# Patient Record
(demographics unavailable — no encounter records)

---

## 2024-10-23 NOTE — REASON FOR VISIT
[FreeTextEntry2] : c/o rough spots on scalp for months. No treatment. No personal history of cutaneous malignancy. With wife.

## 2024-10-29 NOTE — PHYSICAL EXAM
[No Acute Distress] : no acute distress [Well Nourished] : well nourished [Well Developed] : well developed [Well-Appearing] : well-appearing [Normal Sclera/Conjunctiva] : normal sclera/conjunctiva [PERRL] : pupils equal round and reactive to light [EOMI] : extraocular movements intact [Normal Outer Ear/Nose] : the outer ears and nose were normal in appearance [Normal Oropharynx] : the oropharynx was normal [No JVD] : no jugular venous distention [No Lymphadenopathy] : no lymphadenopathy [Supple] : supple [Thyroid Normal, No Nodules] : the thyroid was normal and there were no nodules present [No Respiratory Distress] : no respiratory distress  [No Accessory Muscle Use] : no accessory muscle use [Clear to Auscultation] : lungs were clear to auscultation bilaterally [Normal Rate] : normal rate  [Regular Rhythm] : with a regular rhythm [Normal S1, S2] : normal S1 and S2 [No Murmur] : no murmur heard [No Carotid Bruits] : no carotid bruits [No Abdominal Bruit] : a ~M bruit was not heard ~T in the abdomen [No Varicosities] : no varicosities [Pedal Pulses Present] : the pedal pulses are present [No Edema] : there was no peripheral edema [No Palpable Aorta] : no palpable aorta [No Extremity Clubbing/Cyanosis] : no extremity clubbing/cyanosis [Soft] : abdomen soft [Non Tender] : non-tender [Non-distended] : non-distended [No Masses] : no abdominal mass palpated [No HSM] : no HSM [Normal Bowel Sounds] : normal bowel sounds [Normal Posterior Cervical Nodes] : no posterior cervical lymphadenopathy [Normal Anterior Cervical Nodes] : no anterior cervical lymphadenopathy [No CVA Tenderness] : no CVA  tenderness [No Spinal Tenderness] : no spinal tenderness [No Joint Swelling] : no joint swelling [Grossly Normal Strength/Tone] : grossly normal strength/tone [No Rash] : no rash [Coordination Grossly Intact] : coordination grossly intact [No Focal Deficits] : no focal deficits [Normal Gait] : normal gait [Deep Tendon Reflexes (DTR)] : deep tendon reflexes were 2+ and symmetric [Normal Affect] : the affect was normal [Normal Insight/Judgement] : insight and judgment were intact [de-identified] : Obese

## 2024-10-29 NOTE — HISTORY OF PRESENT ILLNESS
[FreeTextEntry1] : CPE [de-identified] : Most pleasant 69-year-old white male with a history of calcific aortic stenosis status post TAVR July 2023 for progressive BURNETTE, hypertension, hyperlipidemia, medically managed coronary artery disease, BPH, diverticulosis, insomnia, who comes in for CPE, last seen in our clinic 8/2024 for acute illness. He sees multiple providers including cardiology urology.  He doesn't want flu shot, stating a friend told him it causes strokes. SADIE paper last year using Medicare data base analysis observed 25% RR increase [id number] days post vaccine, not seen earlier or later. co admn covid and flu associated with an additional 3cases/100k doses ONLY in pts over age 85. In fact, 5 other studies including 2022 Neurology article retrospective analysis of large data set shows comparable raw stroke rates in pts receiving flu shot vs not, within prior 2 wks (and adjustment for RFs actually favored flu shot, 12% risk reduction).  Metaanalysis 2017 18% risk reduction of stroke with flu vaccine.   Pt remains adamant, unfortunately.  He has no acute concerns.

## 2024-10-29 NOTE — ASSESSMENT
[FreeTextEntry1] : *Calcific aortic stenosis status post TAVR July 2023. *Nonocclusive coronary artery disease. Sees cardiology regularly, risk factors being well managed on statin amlodipine lisinopril hydrochlorothiazide baby aspirin, 12.5 mg metoprolol.    *Hypertension. 4 drugs. Normal creatinine and potassium 3/24. Update proteinuria screen next year, neg 2023.  *Hyperlipidemia and rosuvastatin 40 mg. Good ctrl, LDL 71, 2024.89 Lpa, in part inflated by statin.  *BPH. Followed by Dr. Page every 6 months, PSA through urology 5.7-7.0 over the last 5 years. Never biopsy or MRI.  *Insomnia. Rumination. I cannot elicit a history of restless leg syndrome obstructive sleep apnea cataplexy and narcolepsy. Patient acknowledges he is somewhat OCD and will get up in the middle the night to work on stuff. Has tried Ambien without benefit, melatonin brief relief for 4 hours. EKG without QTc prolongation 392msec 6/12/23. Trial nortriptyline 10 mg nightly without benefit, could not get his insurance to pay for Dayvigo 5 mg nightly.  *Routine adult health maintenance.  Vaccinations: Tdap: More than 10 years, Prevnar 13 2021 Prevnar 20.2023, Completed shingrix. Declines flu, covid boosters. RSV not indicated Colon cancer screening: Last colonoscopy 2017, Dr. Zayas. No history of polyps adequate colon prep no family history colon cancer. Recommend follow-up colonoscopy by 2027.  Screen negative for exposure to HBV HCV HIV, 2023.  It was a pleasure to visit with Mr. Gonzalez today. Answered all questions best I could. Disposition:6months after labs  Time:40min

## 2024-10-29 NOTE — PHYSICAL EXAM
[No Acute Distress] : no acute distress [Well Nourished] : well nourished [Well Developed] : well developed [Well-Appearing] : well-appearing [Normal Sclera/Conjunctiva] : normal sclera/conjunctiva [PERRL] : pupils equal round and reactive to light [EOMI] : extraocular movements intact [Normal Outer Ear/Nose] : the outer ears and nose were normal in appearance [Normal Oropharynx] : the oropharynx was normal [No JVD] : no jugular venous distention [No Lymphadenopathy] : no lymphadenopathy [Supple] : supple [Thyroid Normal, No Nodules] : the thyroid was normal and there were no nodules present [No Respiratory Distress] : no respiratory distress  [No Accessory Muscle Use] : no accessory muscle use [Clear to Auscultation] : lungs were clear to auscultation bilaterally [Normal Rate] : normal rate  [Regular Rhythm] : with a regular rhythm [Normal S1, S2] : normal S1 and S2 [No Murmur] : no murmur heard [No Carotid Bruits] : no carotid bruits [No Abdominal Bruit] : a ~M bruit was not heard ~T in the abdomen [No Varicosities] : no varicosities [Pedal Pulses Present] : the pedal pulses are present [No Edema] : there was no peripheral edema [No Palpable Aorta] : no palpable aorta [No Extremity Clubbing/Cyanosis] : no extremity clubbing/cyanosis [Soft] : abdomen soft [Non Tender] : non-tender [Non-distended] : non-distended [No Masses] : no abdominal mass palpated [No HSM] : no HSM [Normal Bowel Sounds] : normal bowel sounds [Normal Posterior Cervical Nodes] : no posterior cervical lymphadenopathy [Normal Anterior Cervical Nodes] : no anterior cervical lymphadenopathy [No CVA Tenderness] : no CVA  tenderness [No Spinal Tenderness] : no spinal tenderness [No Joint Swelling] : no joint swelling [Grossly Normal Strength/Tone] : grossly normal strength/tone [No Rash] : no rash [Coordination Grossly Intact] : coordination grossly intact [No Focal Deficits] : no focal deficits [Normal Gait] : normal gait [Deep Tendon Reflexes (DTR)] : deep tendon reflexes were 2+ and symmetric [Normal Affect] : the affect was normal [Normal Insight/Judgement] : insight and judgment were intact [de-identified] : Obese

## 2024-10-29 NOTE — HISTORY OF PRESENT ILLNESS
[FreeTextEntry1] : CPE [de-identified] : Most pleasant 69-year-old white male with a history of calcific aortic stenosis status post TAVR July 2023 for progressive BURNETTE, hypertension, hyperlipidemia, medically managed coronary artery disease, BPH, diverticulosis, insomnia, who comes in for CPE, last seen in our clinic 8/2024 for acute illness. He sees multiple providers including cardiology urology.  He doesn't want flu shot, stating a friend told him it causes strokes. SADIE paper last year using Medicare data base analysis observed 25% RR increase [id number] days post vaccine, not seen earlier or later. co admn covid and flu associated with an additional 3cases/100k doses ONLY in pts over age 85. In fact, 5 other studies including 2022 Neurology article retrospective analysis of large data set shows comparable raw stroke rates in pts receiving flu shot vs not, within prior 2 wks (and adjustment for RFs actually favored flu shot, 12% risk reduction).  Metaanalysis 2017 18% risk reduction of stroke with flu vaccine.   Pt remains adamant, unfortunately.  He has no acute concerns.

## 2024-10-29 NOTE — HEALTH RISK ASSESSMENT
[Excellent] : ~his/her~  mood as  excellent [Yes] : Yes [Monthly or less (1 pt)] : Monthly or less (1 point) [1 or 2 (0 pts)] : 1 or 2 (0 points) [Never (0 pts)] : Never (0 points) [No] : In the past 12 months have you used drugs other than those required for medical reasons? No [No falls in past year] : Patient reported no falls in the past year [0] : 2) Feeling down, depressed, or hopeless: Not at all (0) [PHQ-2 Negative - No further assessment needed] : PHQ-2 Negative - No further assessment needed [HIV test declined] : HIV test declined [Hepatitis C test declined] : Hepatitis C test declined [None] : None [With Family] : lives with family [College] : College [] :  [Sexually Active] : sexually active [Feels Safe at Home] : Feels safe at home [Fully functional (bathing, dressing, toileting, transferring, walking, feeding)] : Fully functional (bathing, dressing, toileting, transferring, walking, feeding) [Fully functional (using the telephone, shopping, preparing meals, housekeeping, doing laundry, using] : Fully functional and needs no help or supervision to perform IADLs (using the telephone, shopping, preparing meals, housekeeping, doing laundry, using transportation, managing medications and managing finances) [Reports normal functional visual acuity (ie: able to read med bottle)] : Reports normal functional visual acuity [Smoke Detector] : smoke detector [Carbon Monoxide Detector] : carbon monoxide detector [Safety elements used in home] : safety elements used in home [Seat Belt] :  uses seat belt [Sunscreen] : uses sunscreen [Audit-CScore] : 1 [de-identified] : Maintains home. No formal exercise [de-identified] : Too good. [place] [place] : [place] [[id number]] : 0 [Change in mental status noted] : No change in mental status noted [Language] : denies difficulty with language [Behavior] : denies difficulty with behavior [Learning/Retaining New Information] : denies difficulty learning/retaining new information [Handling Complex Tasks] : denies difficulty handling complex tasks [Reasoning] : denies difficulty with reasoning [Spatial Ability and Orientation] : denies difficulty with spatial ability and orientation [High Risk Behavior] : no high risk behavior [Reports changes in hearing] : Reports no changes in hearing [Reports changes in vision] : Reports no changes in vision [Reports changes in dental health] : Reports no changes in dental health [Travel to Developing Areas] : does not  travel to developing areas [TB Exposure] : is not being exposed to tuberculosis [Caregiver Concerns] : does not have caregiver concerns [ColonoscopyDate] : 01/17 [ColonoscopyComments] : Procaccino [HIVDate] : 8/23 [HepatitisCDate] : 8/23

## 2024-11-07 NOTE — REASON FOR VISIT
[Structural Heart and Valve Disease] : structural heart and valve disease [Hypertension] : hypertension [Spouse] : spouse [FreeTextEntry3] : [place]

## 2024-11-07 NOTE — CARDIOLOGY SUMMARY
[de-identified] : February 13, 2023 sinus rhythm within normal limits August 14, 2023 sinus rhythm within normal limits November 17, 2023 sinus PVC otherwise normal March 7, 2024 sinus rhythm November 7, 2024 sinus rhythm [de-identified] : 2021 [de-identified] : February 2021 JORGE 1.0  December 13, 2021 moderate to severe aortic stenosis normal LV systolic function November 17, 2023 normal LV TAVR improved AI August 14, 2023 normal LV TAVR present with AI October 20, 2022 moderate to severe AS normal LV systolic function peak velocity 4 m/s  June 2024 normal LV mild AI [de-identified] : CT 2019 nonobstructive CAD

## 2024-11-07 NOTE — HISTORY OF PRESENT ILLNESS
[FreeTextEntry1] : Follow-up visit  status post TAVR is known PVCs.    No chest pain dyspnea.  Recent dental work involving a crown for which he took antibiotics

## 2024-11-07 NOTE — ASSESSMENT
[FreeTextEntry1] : 69-year-old man, status post TAVR.  Prosthetic AI.  Coronary atherosclerosis.  BP controlled.  PVCs.  Overweight.

## 2024-11-07 NOTE — PHYSICAL EXAM
[Well Developed] : well developed [Well Nourished] : well nourished [No Acute Distress] : no acute distress [Normal Conjunctiva] : normal conjunctiva [Normal Venous Pressure] : normal venous pressure [No Carotid Bruit] : no carotid bruit [No Rub] : no rub [No Gallop] : no gallop [Murmur] : murmur [Clear Lung Fields] : clear lung fields [Good Air Entry] : good air entry [No Respiratory Distress] : no respiratory distress  [Soft] : abdomen soft [Non Tender] : non-tender [No Masses/organomegaly] : no masses/organomegaly [Normal Bowel Sounds] : normal bowel sounds [Normal Gait] : normal gait [No Edema] : no edema [No Cyanosis] : no cyanosis [No Clubbing] : no clubbing [Venous varicosities] : venous varicosities [No Rash] : no rash [No Skin Lesions] : no skin lesions [Moves all extremities] : moves all extremities [No Focal Deficits] : no focal deficits [Normal Speech] : normal speech [Alert and Oriented] : alert and oriented [Normal memory] : normal memory [de-identified] : 3/6 BOB, diastolic blow [de-identified] : Soft S2

## 2024-11-07 NOTE — REVIEW OF SYSTEMS
[Weight Gain (___ Lbs)] : no recent weight gain [Feeling Fatigued] : feeling fatigued [Weight Loss (___ Lbs)] : [unfilled] ~Ulb weight loss [Palpitations] : palpitations [Heartburn] : heartburn [Nocturia] : nocturia [Negative] : Respiratory

## 2024-11-15 NOTE — HISTORY OF PRESENT ILLNESS
[FreeTextEntry8] : Most pleasant 69-year-old white male with a history of calcific aortic stenosis status post TAVR July 2023 for progressive BURNETTE, hypertension, hyperlipidemia, medically managed coronary artery disease, BPH, diverticulosis, insomnia, who comes in for 5 day h/o sinus congestion, post nasal drip, failing Navage. Declines fever myalgia sore throat.  He has not had his flu shot this year.

## 2024-11-15 NOTE — ASSESSMENT
[FreeTextEntry1] : *Sinusitis. Continue lavage, start flonase and azithromycin. Swab RVP. Time 10 min

## 2024-11-15 NOTE — PHYSICAL EXAM
[Normal TMs] : both tympanic membranes were normal [No Lymphadenopathy] : no lymphadenopathy [Normal] : affect was normal and insight and judgment were intact [de-identified] : tired [de-identified] : severe cobblestoning, no exudates injection or sinus tenderness

## 2024-11-25 NOTE — REVIEW OF SYSTEMS
[As Noted in HPI] : as noted in HPI [Negative] : Heme/Lymph [Chest Pain] : no chest pain [Palpitations] : no palpitations [FreeTextEntry4] : Recent Sinus Infection  [FreeTextEntry5] : Hx of TVAR 2023

## 2024-11-25 NOTE — PHYSICAL EXAM
[Normal Breath Sounds] : Normal breath sounds [Normal Heart Sounds] : normal heart sounds [No Rash or Lesion] : No rash or lesion [Alert] : alert [Oriented to Person] : oriented to person [Oriented to Place] : oriented to place [Oriented to Time] : oriented to time [Calm] : calm [de-identified] : Abdomen soft and NT  [de-identified] : NAD  [de-identified] : NCAT  [FreeTextEntry1] : Miralax prep reviewed with patient  Supplements reviewed to hold prior to colonoscopy.  Patient to call Dr. Diehl's office to get clearance for outpatient colonoscopy given TAVR and any need for preoperative antibiotics. Last Echo done 6/2024 with normal LV.  Day of instructions reviewed and understood by patient

## 2024-11-25 NOTE — HISTORY OF PRESENT ILLNESS
[FreeTextEntry1] : Jay Gonzalez is a 70 y/o M who presents for screening colonoscopy. His last colonoscopy was in 2017 which noted diverticulosis in left colon and he was recommended for follow up in 5 years. He returned to the office in 2022 for repeat colonoscopy and was recommended for hospital colonoscopy given severe AS. He never obtained this colonoscopy and proceeded with work up of cardiac health. He is now s/p TAVR in 2023 and followed closely by Dr. Diehl. He was last seen in office last week and last echo in 6/2024 which showed normal LV funtion.  He recently completed antibiotics for an acute sinusitis. Patient denies weight loss, abdominal pain, nausea/vomiting, diarrhea, constipation and bleeding.

## 2024-11-25 NOTE — HISTORY OF PRESENT ILLNESS
[FreeTextEntry1] : Jay Gonzalez is a 68 y/o M who presents for screening colonoscopy. His last colonoscopy was in 2017 which noted diverticulosis in left colon and he was recommended for follow up in 5 years. He returned to the office in 2022 for repeat colonoscopy and was recommended for hospital colonoscopy given severe AS. He never obtained this colonoscopy and proceeded with work up of cardiac health. He is now s/p TAVR in 2023 and followed closely by Dr. Diehl. He was last seen in office last week and last echo in 6/2024 which showed normal LV funtion.  He recently completed antibiotics for an acute sinusitis. Patient denies weight loss, abdominal pain, nausea/vomiting, diarrhea, constipation and bleeding.

## 2024-11-25 NOTE — PHYSICAL EXAM
[Normal Breath Sounds] : Normal breath sounds [Normal Heart Sounds] : normal heart sounds [No Rash or Lesion] : No rash or lesion [Alert] : alert [Oriented to Person] : oriented to person [Oriented to Place] : oriented to place [Oriented to Time] : oriented to time [Calm] : calm [de-identified] : Abdomen soft and NT  [de-identified] : NAD  [de-identified] : NCAT  [FreeTextEntry1] : Miralax prep reviewed with patient  Supplements reviewed to hold prior to colonoscopy.  Patient to call Dr. Diehl's office to get clearance for outpatient colonoscopy given TAVR and any need for preoperative antibiotics. Last Echo done 6/2024 with normal LV.  Day of instructions reviewed and understood by patient

## 2025-01-13 NOTE — CARDIOLOGY SUMMARY
[de-identified] : February 13, 2023 sinus rhythm within normal limits August 14, 2023 sinus rhythm within normal limits November 17, 2023 sinus PVC otherwise normal March 7, 2024 sinus rhythm November 7, 2024 sinus rhythm January 13, 2024 sinus rhythm [de-identified] : 2021 [de-identified] : February 2021 JORGE 1.0  December 13, 2021 moderate to severe aortic stenosis normal LV systolic function November 17, 2023 normal LV TAVR improved AI August 14, 2023 normal LV TAVR present with AI October 20, 2022 moderate to severe AS normal LV systolic function peak velocity 4 m/s June 2024 normal LV mild AI [de-identified] : CT 2019 nonobstructive CAD

## 2025-01-13 NOTE — REASON FOR VISIT
[Symptom and Test Evaluation] : symptom and test evaluation [Structural Heart and Valve Disease] : structural heart and valve disease [Hypertension] : hypertension [Spouse] : spouse [FreeTextEntry3] : [place]

## 2025-01-13 NOTE — HISTORY OF PRESENT ILLNESS
[FreeTextEntry1] : Follow-up visit  status post TAVR is known PVCs.    No chest pain dyspnea.  Seen today with new symptoms of visual disturbance in the left eye described as kaleidoscope brief.  Several times for which she saw Dr. Hamilton ophthalmologist was reassessed cardiologically (today no palpitations.  No change in medicine.  Does feel little weak and tired which she is attributing to relatively low blood pressure

## 2025-01-13 NOTE — ASSESSMENT
[FreeTextEntry1] : 69-year-old man, status post TAVR.  Prosthetic AI.  Coronary atherosclerosis.  BP controlled.  PVCs.  Overweight.  Left eye visual disturbance possibly related to his TAVR cardiovascular system.  Controlled blood pressure which may be relatively for low for him

## 2025-01-13 NOTE — REVIEW OF SYSTEMS
[Weight Gain (___ Lbs)] : no recent weight gain [Feeling Fatigued] : feeling fatigued [Weight Loss (___ Lbs)] : no recent weight loss [Palpitations] : palpitations [Heartburn] : heartburn [Nocturia] : nocturia [Negative] : Respiratory [FreeTextEntry3] : See HPI

## 2025-01-13 NOTE — DISCUSSION/SUMMARY
[Patient] : the patient [Risks] : risks [Benefits] : benefits [Alternatives] : alternatives [___ Month(s)] : in [unfilled] month(s) [FreeTextEntry1] :  Follow-up 6 months blood work reviewed and discussed with patient and wife.  Discussed diet and weight loss.  Questions addressed SBE prophylaxis discussed . Continue amlodipine aspirin famotidine lisinopril HCT metoprolol rosuvastatin will reduce amlodipine dose however to 2.5 mg neurologic referral, hookup for extended cardiac monitor today.  Carotid duplex ordered.  Was addressed with patient and wife.  If all negative follow-up in several months including repeat echo     [EKG obtained to assist in diagnosis and management of assessed problem(s)] : EKG obtained to assist in diagnosis and management of assessed problem(s)

## 2025-01-13 NOTE — ADDENDUM
[FreeTextEntry1] : As of date of above visit, patient was stable for colonoscopy at low cardiac risk.

## 2025-01-13 NOTE — PHYSICAL EXAM
[Well Developed] : well developed [Well Nourished] : well nourished [No Acute Distress] : no acute distress [Normal Conjunctiva] : normal conjunctiva [Normal Venous Pressure] : normal venous pressure [No Carotid Bruit] : no carotid bruit [No Rub] : no rub [No Gallop] : no gallop [Murmur] : murmur [Clear Lung Fields] : clear lung fields [Good Air Entry] : good air entry [No Respiratory Distress] : no respiratory distress  [Soft] : abdomen soft [Non Tender] : non-tender [No Masses/organomegaly] : no masses/organomegaly [Normal Bowel Sounds] : normal bowel sounds [Normal Gait] : normal gait [No Edema] : no edema [No Cyanosis] : no cyanosis [No Clubbing] : no clubbing [Venous varicosities] : venous varicosities [No Rash] : no rash [No Skin Lesions] : no skin lesions [Moves all extremities] : moves all extremities [No Focal Deficits] : no focal deficits [Normal Speech] : normal speech [Alert and Oriented] : alert and oriented [Normal memory] : normal memory [de-identified] : 3/6 BOB, diastolic blow [de-identified] : Soft S2

## 2025-03-06 NOTE — PHYSICAL EXAM
[FreeTextEntry1] : Head:  Normocephalic Neck: Supple nontender no carotid bruits.    Mental Status:  Alert Oriented X3 Speech normal and no aphasia or dysarthria.  Cranial Nerves:  PERRL, Fundi not visualized.  Visual Fields full  EOMI no diplopia no ptosis no nystagmus, V through XII intact.  Motor:  No drift, normal strength tone and coordination and no focal atrophy. No abnormal movements. No dysmetria.  Normal rapid alternating movements.   DTRs: Symmetric 1-2+.  Plantars flexor.  No Clonus.  Sensory:  Normal testing with pin light touch and vibration and  Joint position sense.   Gait:  Normal including tandem walking heel toe walking and Rhomberg.

## 2025-03-06 NOTE — REASON FOR VISIT
[Initial Evaluation] : an initial evaluation [FreeTextEntry1] : Episodes of left eye transient kaleidoscope vision abnormal carotid ultrasound with bilateral stenosis lightheadedness

## 2025-03-06 NOTE — HISTORY OF PRESENT ILLNESS
[FreeTextEntry1] : This patient is seen for an office consultation.  He is a 69-year-old male patient with a history of hypertension CAD status post TAVR hyperlipidemia PVC overweight.  He describes 2 episodes the first in November 2024 and the second day in January 2025 of acute onset kaleidoscope vision affecting the left eye.  The episodes were both transient lasting approximately 5 minutes.  There was no headache or other neurological focality.  Ophthalmological exam was unremarkable.  The patient denies a past history of migraine.  He admits to lightheadedness poorly characterized not necessarily positional and without vertigo syncope or seizure tinnitus or hearing loss or other associated neurological symptoms.  Multiple medications have been reviewed.  Carotid ultrasound performed on 2/19/2025 revealed 50 to 69% bilateral moderate stenosis with plaque.  Family history positive for CVA in her brother father and mother.

## 2025-03-06 NOTE — ASSESSMENT
[FreeTextEntry1] : Impression: This 69-year-old male patient has a history of hypertension hyperlipidemia CAD status post TAVR PVC overweight presents with 2 stereotyped episodes lasting 5 minutes of transient kaleidoscope of vision affecting the left eye without headache.  This presentation is consistent with a diagnosis of ocular migraine.  In addition the patient has had carotid artery ultrasound on 2/19/2025 revealing 50 to 69% bilateral carotid stenosis with plaque.  He describes nonspecific mild lightheadedness.  Doubt that the left eye visual complaints are due to the underlying carotid stenosis.  I would suspect the visual complaints are unrelated.  Recommendations: At this juncture management would be conservative for what would be described as a moderate bilateral carotid stenosis asymptomatic.  Would obtain MRI of the brain and MRA studies brain and neck to fully evaluate the carotids and intracranial circulation and rule out other intracranial pathology.  Agree with vascular risk factor reduction including aspirin statin blood pressure control.  Discussed in detail with the patient and his wife.  Office follow-up as needed.

## 2025-03-25 NOTE — HISTORY OF PRESENT ILLNESS
[FreeTextEntry1] : f/u anemia [de-identified] : Most pleasant 69-year-old white male with a history of calcific aortic stenosis status post TAVR July 2023 for progressive BURNETTE, hypertension, hyperlipidemia, medically managed CAD and asx PAD (B carotid stenosis)coronary, BPH, diverticulosis, insomnia, who comes in to review recent labs.  Labs looked quite reassuring including normal TSH, Cr stable at 0.9, except:  Hemoglobin baseline 14s since 2017, 15.1 on 6/2023 down to 12.2 on 3/7/25 without any intervening hgb. TAVR placed 7/17/2023. Normocytic with normal RDW, WBC/diff and Plat. Denies bloody nose, urine, or stool. No significant NSAID use. Takes baby aspirin, pepcid, milk thistle curcumin, no a/c. Stable PSA not on androgen deprivation.  He sees multiple providers including cardiology urology. Recent MRI/MRA without high grade stenosis, 2025.   He has no acute concerns.

## 2025-03-25 NOTE — PHYSICAL EXAM
[Normal] : soft, non-tender, non-distended, no masses palpated, no HSM and normal bowel sounds [de-identified] : BOB no AR I can hear.

## 2025-03-25 NOTE — HISTORY OF PRESENT ILLNESS
[FreeTextEntry1] : f/u anemia [de-identified] : Most pleasant 69-year-old white male with a history of calcific aortic stenosis status post TAVR July 2023 for progressive BURENTTE, hypertension, hyperlipidemia, medically managed CAD and asx PAD (B carotid stenosis)coronary, BPH, diverticulosis, insomnia, who comes in to review recent labs.  Labs looked quite reassuring including normal TSH, Cr stable at 0.9, except:  Hemoglobin baseline 14s since 2017, 15.1 on 6/2023 down to 12.2 on 3/7/25 without any intervening hgb. TAVR placed 7/17/2023. Normocytic with normal RDW, WBC/diff and Plat. Denies bloody nose, urine, or stool. No significant NSAID use. Takes baby aspirin, pepcid, milk thistle curcumin, no a/c. Stable PSA not on androgen deprivation.  He sees multiple providers including cardiology urology. Recent MRI/MRA without high grade stenosis, 2025.   He has no acute concerns.

## 2025-03-25 NOTE — ASSESSMENT
[FreeTextEntry1] : *Normocytic anemia. Hgb last normal pre op 2023,  (sunrise) baby aspirin TAVR 7/2023. Will replicate result, check FIT H pylori Ig panel Iron b12 folate haptoglobin t&d BR. Dr Groves performed colonoscopy without EGD 12/2024 for h/o AS notable for severe sigmoid diverticulosis but no AVMs or polyps, advised rpt 12/2029.  Spoke with cardiology who noted AI increases hemolysis risk, which pt has. Also, (addendum 3/25) LDH BR haptoglobin support hemolysis w normal Hpyl stool Ag, FIT, iron/b12/folate/TSH/FLC ratio.  Pt instructed to take OTC FeSO4 324mg weekly.  *Calcific aortic stenosis status post TAVR July 2023. *Nonocclusive coronary artery disease.  *PAD (B carotid stenosis). Sees cardiology regularly, risk factors being well managed on statin amlodipine lisinopril hydrochlorothiazide baby aspirin, 12.5 mg metoprolol. Annual carotid surveillance.  *Hypertension. 4 drugs. Normal creatinine and potassium without albuminuria.  *Hyperlipidemia on rosuvastatin 40 mg. Good ctrl, LDL 44, 2024.89 Lpa, in part inflated by statin.  *BPH. Stable elevated PSA.Followed by Dr. Page every 6 months, PSA through urology 5.7-7.0 over the last 5 years. Never biopsy or MRI.  *Insomnia. Rumination. I cannot elicit a history of restless leg syndrome obstructive sleep apnea cataplexy and narcolepsy. Patient acknowledges he is somewhat OCD and will get up in the middle the night to work on stuff. Has tried Ambien without benefit, melatonin brief relief for 4 hours. EKG without QTc prolongation 392msec 6/12/23. Trial nortriptyline 10 mg nightly without benefit, could not get his insurance to pay for Dayvigo 5 mg nightly. Trazodone ambien may be an option.  *Routine adult health maintenance. Vaccinations: Tdap: More than 10 years, Prevnar 13 2021 Prevnar 20.2023, Completed shingrix. Declines flu, covid boosters. RSV not indicated Colon cancer screening: Last colonoscopy 2023, Dr. Zayas. No history of polyps adequate colon prep no family history colon cancer. Recommend follow-up colonoscopy by 2027.  Screen negative for exposure to HBV HCV HIV, 2023.  It was a pleasure to visit with Mr. Gonzalez today. Answered all questions best I could. Disposition:3 months  Time:25min.

## 2025-03-25 NOTE — HISTORY OF PRESENT ILLNESS
[FreeTextEntry1] : f/u anemia [de-identified] : Most pleasant 69-year-old white male with a history of calcific aortic stenosis status post TAVR July 2023 for progressive BURNETTE, hypertension, hyperlipidemia, medically managed CAD and asx PAD (B carotid stenosis)coronary, BPH, diverticulosis, insomnia, who comes in to review recent labs.  Labs looked quite reassuring including normal TSH, Cr stable at 0.9, except:  Hemoglobin baseline 14s since 2017, 15.1 on 6/2023 down to 12.2 on 3/7/25 without any intervening hgb. TAVR placed 7/17/2023. Normocytic with normal RDW, WBC/diff and Plat. Denies bloody nose, urine, or stool. No significant NSAID use. Takes baby aspirin, pepcid, milk thistle curcumin, no a/c. Stable PSA not on androgen deprivation.  He sees multiple providers including cardiology urology. Recent MRI/MRA without high grade stenosis, 2025.   He has no acute concerns.

## 2025-03-25 NOTE — PHYSICAL EXAM
[Normal] : soft, non-tender, non-distended, no masses palpated, no HSM and normal bowel sounds [de-identified] : BOB no AR I can hear.

## 2025-05-09 NOTE — DISCUSSION/SUMMARY
[Patient] : the patient [Risks] : risks [Benefits] : benefits [Alternatives] : alternatives [___ Month(s)] : in [unfilled] month(s) [FreeTextEntry1] : Discussed with patient and wife in detail reviewed hematology consult.  He will have follow-up with structural heart Dr. Arora to discuss prosthetic insufficiency as relates to his anemia and symptoms.  Possible repeat procedure Multiple questions addressed with patient and wife.     [EKG obtained to assist in diagnosis and management of assessed problem(s)] : EKG obtained to assist in diagnosis and management of assessed problem(s)

## 2025-05-09 NOTE — HISTORY OF PRESENT ILLNESS
[FreeTextEntry1] : Follow-up visit  status post TAVR , known PVCs.    No chest pain dyspnea.  Active physically.  No chest pain shortness of breath or palpitations.  Has some fatigue.  Has been undergoing workup for anemia which appears to be hemolytic.  Had echo today.

## 2025-05-09 NOTE — PHYSICAL EXAM
[Well Developed] : well developed [Well Nourished] : well nourished [No Acute Distress] : no acute distress [Normal Conjunctiva] : normal conjunctiva [Normal Venous Pressure] : normal venous pressure [No Carotid Bruit] : no carotid bruit [No Rub] : no rub [No Gallop] : no gallop [Murmur] : murmur [Clear Lung Fields] : clear lung fields [Good Air Entry] : good air entry [No Respiratory Distress] : no respiratory distress  [Soft] : abdomen soft [Non Tender] : non-tender [No Masses/organomegaly] : no masses/organomegaly [Normal Bowel Sounds] : normal bowel sounds [Normal Gait] : normal gait [No Edema] : no edema [No Cyanosis] : no cyanosis [No Clubbing] : no clubbing [Venous varicosities] : venous varicosities [No Rash] : no rash [No Skin Lesions] : no skin lesions [Moves all extremities] : moves all extremities [No Focal Deficits] : no focal deficits [Normal Speech] : normal speech [Alert and Oriented] : alert and oriented [Normal memory] : normal memory [de-identified] : 3/6 BOB, diastolic blow best upper sternal border [de-identified] : Soft S2

## 2025-05-09 NOTE — ASSESSMENT
[FreeTextEntry1] : 69-year-old man, status post TAVR.  Prosthetic AI.  Coronary atherosclerosis.  BP controlled.  PVCs.  Overweight.  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

## 2025-05-09 NOTE — CARDIOLOGY SUMMARY
[de-identified] : February 13, 2023 sinus rhythm within normal limits August 14, 2023 sinus rhythm within normal limits November 17, 2023 sinus PVC otherwise normal March 7, 2024 sinus rhythm November 7, 2024 sinus rhythm January 13, 2024 sinus rhythm May 9, 2025 sinus bradycardia [de-identified] : 2021 [de-identified] : February 2021 JORGE 1.0 December 13, 2021 moderate to severe aortic stenosis normal LV systolic function November 17, 2023 normal LV TAVR improved AI August 14, 2023 normal LV TAVR present with AI October 20, 2022 moderate to severe AS normal LV systolic function peak velocity 4 m/s June 2024 normal LV mild AI May 2025 normal LVEF mild to moderate prosthetic aortic valvular insufficiency [de-identified] : CT 2019 nonobstructive CAD

## 2025-06-15 NOTE — CARDIOLOGY SUMMARY
[de-identified] : None today; prior SB with 1st degree AVB [de-identified] : 5/9/25:  Aortic Valve: A bioprosthetic valve replacement is present in the aortic position. A a transcatheter deployed (TAVR) is present in the aortic position. The prosthetic valve is well seated. There is mild-to-moderate intravalvular regurgitation.  Mitral Valve: There is mild calcification of the mitral valve annulus. There is mild mitral regurgitation.  Tricuspid Valve: Structurally normal tricuspid valve with normal leaflet excursion. There is insufficient tricuspid regurgitation detected to calculate pulmonary artery systolic pressure.

## 2025-06-15 NOTE — HISTORY OF PRESENT ILLNESS
[FreeTextEntry1] : Mr. Gonzalez is a 69-year-old male referred back to our valve clinic by Dr. Diehl for an evaluation of his prior TAVR in relation to his Anemia and fatigue. He previously had a TAVR with us on 7/17/2023 with a 26mm Luz 3 via transfemoral approach. He had a recent TTE which reported mild to moderate intravalvular AI.  Today he presents stating he feels OK. He states that 3 months ago he was determined to be anemic, and was worked up by a hematologist and told it was due to hemolysis. He reports often feels fatigued, but denies any SOB or pedal edema. He will occasionally get a "twinge" in his chest which goes to his left shoulder. He walks about 4-5 miles per day.  He had a Transthoracic Echocardiogram which was evaluated with Dr. Isi Longo with impressions as follows:  TTE 5/9/25 NHPP: mild to moderate regurgitation of THV (26 Luz from 2023). The LUZ transcatheter heart valve is well seated. Peak/mean gradients= 26/14mmHg, peak velocity= 2.6m/s.  DVI can't be calculated since there are no LVOT Doppler, but the function appears normal. There is likely moderate paravalvular AI.  This is not well assessed.  It originates from ~1-2 O'clock. The LV is not dilated, LV LENNY= 5.4cm. The AI is exactly the same as it was on the TTE from 8/14/2023. If he is symptomatic, he should be scheduled for a WAN to evaluate the anatomy for PVL closure vs. TAVR explant with SAVR.    He reports feeling some fatigue since January that is not limiting his lifestyle. He is still able to walk up to 5 miles without issue. He has no BURNETTE, angina, or syncope. He had floaters and saw a Neurologist who did an MRI and MRA "that showed no blockage" in his carotids. He is concerned about his lipid lowering therapy being too aggressive. He notes "I get these shooting pains sometimes" which are not new, described as "a throb or a dull pain" in the "upper left" part of his chest; he also notes having a torn left labrum for which he is in PT 3 times a week. His urine is clear and has noted no discoloration. Appetite and bathroom habits are normal.

## 2025-06-15 NOTE — PHYSICAL EXAM
[Well Developed] : well developed [No Acute Distress] : no acute distress [No Rub] : no rub [Murmur] : murmur [Clear Lung Fields] : clear lung fields [No Edema] : no edema [Normal] : alert and oriented, normal memory [de-identified] : I/VI Diastolic Murmur LUSB

## 2025-06-15 NOTE — DISCUSSION/SUMMARY
[FreeTextEntry1] : Mr. BRITO had a prior TAVR in July 2023 (tri-leaflet AV) with likely moderate PVL (and a correlating diastolic murmur) and hemolysis. Looking at his lab trends, this started immediately after his TAVR. His LDH is mildly elevated, but he reports no discoloration of his urine. Interestingly, he only became symptomatic 18 months later, or this past January. I am recommending a WAN with Dr Longo for PVL assessment. His only symptom is fatigue, and he is still walking 4-5 miles regularly. He has had an extensive negative GI work up for anemia. We discussed options (with an eye towards lifetime management given his age), including conservative management and continued close observation (unlikely to be what we recommend), PVL closure if anatomically feasible (probably not my first option given his age), and TAVR explant with SAVR (likely his best option, which sets him up later for a TAV in [place]). I asked that he call to speak with me after his WAN to discuss our impressions and next steps. All questions were answered in detail, and he is in agreement with the management strategy as outlined above. If we are to ultimately pursue surgical explant and SAVR, I would recommend he see our aortic surgeon, Dr Ata Ibarra, to discuss his impressions and the potential risks and benefits of that approach.

## 2025-06-15 NOTE — REASON FOR VISIT
[Structural Heart and Valve Disease] : structural heart and valve disease [Spouse] : spouse [FreeTextEntry3] : Dr. Emanuel

## 2025-06-15 NOTE — PHYSICAL EXAM
[Well Developed] : well developed [No Acute Distress] : no acute distress [No Rub] : no rub [Murmur] : murmur [Clear Lung Fields] : clear lung fields [No Edema] : no edema [Normal] : alert and oriented, normal memory [de-identified] : I/VI Diastolic Murmur LUSB

## 2025-06-15 NOTE — REVIEW OF SYSTEMS
[Feeling Fatigued] : feeling fatigued [Chest Discomfort] : chest discomfort [Joint Pain] : joint pain [Negative] : Psychiatric [SOB] : no shortness of breath [Dyspnea on exertion] : not dyspnea during exertion [Lower Ext Edema] : no extremity edema [Joint Swelling] : no joint swelling

## 2025-06-15 NOTE — CARDIOLOGY SUMMARY
[de-identified] : None today; prior SB with 1st degree AVB [de-identified] : 5/9/25:  Aortic Valve: A bioprosthetic valve replacement is present in the aortic position. A a transcatheter deployed (TAVR) is present in the aortic position. The prosthetic valve is well seated. There is mild-to-moderate intravalvular regurgitation.  Mitral Valve: There is mild calcification of the mitral valve annulus. There is mild mitral regurgitation.  Tricuspid Valve: Structurally normal tricuspid valve with normal leaflet excursion. There is insufficient tricuspid regurgitation detected to calculate pulmonary artery systolic pressure.

## 2025-06-15 NOTE — CARDIOLOGY SUMMARY
[de-identified] : None today; prior SB with 1st degree AVB [de-identified] : 5/9/25:  Aortic Valve: A bioprosthetic valve replacement is present in the aortic position. A a transcatheter deployed (TAVR) is present in the aortic position. The prosthetic valve is well seated. There is mild-to-moderate intravalvular regurgitation.  Mitral Valve: There is mild calcification of the mitral valve annulus. There is mild mitral regurgitation.  Tricuspid Valve: Structurally normal tricuspid valve with normal leaflet excursion. There is insufficient tricuspid regurgitation detected to calculate pulmonary artery systolic pressure.

## 2025-06-26 NOTE — HISTORY OF PRESENT ILLNESS
[FreeTextEntry1] : Skin check. Bleeding spots on scalp and tender bump on back for several bumps. No treatment. With wife Callie. [de-identified] : No personal history of cutaneous malignancy  Father history of squamous cell carcinoma  Pending open heart valve replacement. 3 children and 6 grandchildren. LALO Guzman

## 2025-06-26 NOTE — ASSESSMENT
[FreeTextEntry1] : Alert, oriented, well pleasant.   Sun-exposed cutaneous exam. No evidence of cutaneous malignancy.   Brown, yellow papules and plaques generalized. Seborrheic keratoses. No treatment.   Actinic damage. Reviewed sun protection.   erythematous scaly papules x6 scalp 3-5mm,  x1 right cheek 3mm 7 Solar Keratosis.  Informed consent given. Side effects discussed. Cryotherapy. Tolerated well. Wound care instructed.  Dry patchy scaling and erythema generalized. Asteototic dermatitis start moisturizer daily. Lotion applicator.  Monitor, report and follow up for changes or symptomatic skin lesions.   Follow up 1 year.

## 2025-07-07 NOTE — PHYSICAL EXAM
daily [Well Developed] : well developed [No Acute Distress] : no acute distress [No Rub] : no rub [Murmur] : murmur [Clear Lung Fields] : clear lung fields [No Edema] : no edema [Normal] : alert and oriented, normal memory [de-identified] : I/VI Diastolic Murmur LUSB

## 2025-07-14 NOTE — ASSESSMENT
[FreeTextEntry1] : *Tick bite.  Residence time likely less than 36 hours, tick engorged,Month pathology consistent with engorged female especially knees scapularis.Has tolerated doxycycline before without complicationDespite tetracycline allergy.  200 mg prescription sent. Time 10 minutes

## 2025-07-14 NOTE — HISTORY OF PRESENT ILLNESS
[FreeTextEntry1] : tick bite [de-identified] : Most pleasant 69-year-old white male with a history of calcific aortic stenosis status post TAVR July 2023 for progressive BURNETTE, hypertension, hyperlipidemia, medically managed CAD and asx PAD (B carotid stenosis)coronary, BPH, diverticulosis, insomnia, who comes inAfter removing a tick from his lateral thigh this morning that he noticed while sitting on the toilet.  He has been outside in shorts recently given the heat, recently returned from [facility].  He has dogs.  Last shower yesterday morning, 24 hours ago.  The tick was engorged.  He brings the tick in today as well.  Labs looked quite reassuring including normal TSH, Cr stable at 0.9, except:  Hemoglobin baseline 14s since 2017, 15.1 on 6/2023 down to 12.2 on 3/7/25 without any intervening hgb. TAVR placed 7/17/2023. Normocytic with normal RDW, WBC/diff and Plat. Denies bloody nose, urine, or stool. No significant NSAID use. Takes baby aspirin, pepcid, milk thistle curcumin, no a/c. Stable PSA not on androgen deprivation.  He sees multiple providers including cardiology urology. Recent MRI/MRA without high grade stenosis, 2025.   He has no acute concerns.

## 2025-07-14 NOTE — PHYSICAL EXAM
[Normal] : affect was normal and insight and judgment were intact [de-identified] : Small 5 mm ovoid erythematous patch on distal lateral thigh.

## 2025-07-23 NOTE — DATA REVIEWED
[FreeTextEntry1] :  7/17/25 WAN revealed   1. Left ventricular cavity is normal in size. Left ventricular systolic function is normal with an ejection fraction of 55 % by 3D. There are no regional wall motion abnormalities seen.  2. Normal right ventricular cavity size and normal right ventricular systolic function.  3. A 26 mm +1 cc, APARNA 3 Ultra RESILIA (TAVR) is present in the aortic position. The prosthetic valve has normal function. The peak transaortic velocity is 2.18 m/s, peak transaortic gradient is 18.9 mmHg and mean transaortic gradient is 8.8 mmHg with an LVOT/aortic valve VTI ratio of 0.58. The aortic valve acceleration time is 85 msec. The effective orifice area is estimated at 2.78 cm by the continuity equation. There is no intravalvular regurgitation. There is moderate paravalvular regurgitation (two paravalvular jets). There are two paravalvular aortic regurgitation jets.     A trace jet at 12 O'Clock (3D VCA= 4mm2)     There is a moderate paravalvular aortic regurgitation originating from 4-5 O'Clock (near the commissure of the left and right cusps) with a total EROA of 18mm2.     The total average EROA= 22mm2.  4. There is no other significant valve disease.  5. No prior echocardiogram is available for comparison.   5/9/25 TTE revealed 1. Left ventricular cavity is normal in size. Left ventricular wall thickness is mildly increased. Left ventricular systolic function is normal with an ejection fraction of 58 % by Carvalho's method of disks 65 % by 3D. 2. There is normal LV mass and concentric remodeling. 3. Normal left ventricular diastolic function. 4. Normal right ventricular cavity size, with normal wall thickness, and normal right ventricular systolic function. Tricuspid annular plane systolic excursion (TAPSE) is 2.4 cm (normal >=1.7 cm). 5. Mild mitral regurgitation. 6. Aortic root at the sinuses of Valsalva is normal in size, measuring 3.05 cm (indexed 1.37 cm/m). 7. A bioprosthetic valve replacement Transcatheter deployed (TAVR) valve replacement is present in the aortic position The prosthetic valve is well seated. Mild-to-moderate intravalvular regurgitation. 8. The main pulmonary artery is normal in size, origin, and position. 9. There is mild calcification of the mitral valve annulus. 10. Normal left and right atrial size. 11. The interatrial septum appears intact. 12. The inferior vena cava is normal in size (normal <2.1cm) with normal inspiratory collapse (normal >50%) consistent with normal right atrial pressure ( R 3, range 0-5mmHg). 13. Structurally normal tricuspid valve with normal leaflet excursion. Pulmonary artery systolic pressure could not be estimated. 14. Structurally normal pulmonic valve with normal leaflet excursion. 15. No pericardial effusion seen. 16. No left ventricular hypertrophy.

## 2025-07-23 NOTE — HISTORY OF PRESENT ILLNESS
[FreeTextEntry1] :   Mr. BRITO is a 70 year old male with  past medical history of  TAVR on 7/17/2023 with a 26mm Luz 3 via transfemoral approach with Dr. Rodriguez/ Dr. Arora, hypertension, hyperlipidemia, diverticulosis. He had a recent TTE which reported mild to moderate intravalvular AI.  He saw Dr. Pito Arora on 6/3/25 and discussed options. As per Dr. Arora's note: He discussed (with an eye towards lifetime management given his age), including conservative management and continued close observation (unlikely to be what we recommend), PVL closure if anatomically feasible (probably not my first option given his age), and TAVR explant with SAVR (likely his best option, which sets him up later for a TAV in [place]). He was advised to speak with Dr. Arora after his WAN to discuss our impressions and next steps.   He reports feeling some fatigue since January that is not limiting his lifestyle. He is still able to walk up to 5 miles without difficulties. . He will occasionally get a "twinge" in his chest which goes to his left shoulder. He reports he has shooting pains sometimes" which are not new, described as "a throb or a dull pain" in the "upper left" part of his chest; he also notes having a torn left labrum for which he is in PT 3 times a week.  He states that 3 months ago he was determined to be anemic, and was worked up by a hematologist and told it was due to hemolysis . He had floaters and saw a Neurologist who did an MRI and MRA "that showed no blockage" in his carotids    Acute , chronic or acute on chronic NYHA class Therapy used to manage Chf Diuretic ACE  ARB

## 2025-07-23 NOTE — END OF VISIT
[FreeTextEntry3] :  I, ÓSCAR Swift , personally performed the evaluation and management (E/M) services for this established  patient. That E/M includes conducting the initial examination, assessing all conditions, and establishing the plan of care. Today, ANNIE STRAUSS  was here to observe my evaluation and management services for this patient.

## 2025-07-24 NOTE — HISTORY OF PRESENT ILLNESS
[FreeTextEntry1] :  Mr. BRITO is a 70 year old male with  past medical history of  TAVR on 7/17/2023 with a 26mm Luz 3 via transfemoral approach with Dr. Rodriguez/ Dr. Arora, hypertension, hyperlipidemia, diverticulosis. He had a recent TTE which reported mild to moderate intravalvular AI.  He saw Dr. Pito Arora on 6/3/25 and discussed options. As per Dr. Arora's note: He discussed (with an eye towards lifetime management given his age), including conservative management and continued close observation (unlikely to be what we recommend), PVL closure if anatomically feasible (probably not my first option given his age), and TAVR explant with SAVR (likely his best option, which sets him up later for a TAV in [place]). He was advised to speak with Dr. Arora after his WAN to discuss our impressions and next steps.   He reports feeling some fatigue since January that is not limiting his lifestyle. He is still able to walk up to 5 miles without difficulties. He can climb up to 2 FOS . He uses 2 pillows to sleep. He will occasionally get a "twinge" in his chest which goes to his left shoulder. He reports he has shooting pains sometimes" which are not new, described as "a throb or a dull pain" in the "upper left" part of his chest; he also notes having a torn left labrum for which he is in PT 3 times a week.  He states that 3 months ago he was determined to be anemic, and was worked up by a hematologist and told it was due to hemolysis . He had floaters and saw a Neurologist who did an MRI and MRA "that showed no blockage" in his carotids     NYHA class I

## 2025-07-24 NOTE — ASSESSMENT
[FreeTextEntry1] : *************************************************************************************************************************************************************************************************************************************************************************************************************************************************************************************************************************************************************************************************************************************************************************************************************************************************************************************************************************************************************************************************************************************************************************************************************************************************************************************************************************************************************************************************************************************************  69M h/o HTN, bilateral carotid stenosis [although recent MRI shows no significant stenosis], TAVR [26mm Luz 3 valve, transfemoral] on 7/17/2023 who recently p/w fatigue, found to be hemolyzing and subsequent workup revealed likely moderate paravalvular leak. Patient states that his symptoms of fatigue began approximately 18 months post-TAVR [~1/2025], despite maintaining a walking regimen of 4-5 miles regularly, and an extensive negative GI workup for anemia has been completed.   A WAN was performed for PVL assessment, which confirmed no intravalvular leak, but two moderate PVLs. Options discussed, considering lifetime management given his age, included conservative management (unlikely), PVL closure (less likely given age), and TAVR explant with surgical aortic valve replacement (SAVR; likely best option allowing for future TAV in [place]).    1. Our recommendations are for TAVR explant with SAVR, which would set him up for long-term management with eventual TAVR in SAVR.   2. It has been two years since his last angiogram, I will touch base with Dr. Diehl, as I feel he will need repeat imaging. He will then also require PSTs. We will schedule him surgery for August 18th or after.

## 2025-07-24 NOTE — PHYSICAL EXAM
[Sclera] : the sclera and conjunctiva were normal [PERRL With Normal Accommodation] : pupils were equal in size, round, and reactive to light [Neck Appearance] : the appearance of the neck was normal [] : no respiratory distress [Respiration, Rhythm And Depth] : normal respiratory rhythm and effort [Auscultation Breath Sounds / Voice Sounds] : lungs were clear to auscultation bilaterally [Apical Impulse] : the apical impulse was normal [Heart Rate And Rhythm] : heart rate was normal and rhythm regular [Heart Sounds] : normal S1 and S2 [Systolic grade ___/6] : A grade [unfilled]/6 systolic murmur was heard. [Examination Of The Chest] : the chest was normal in appearance [2+] : left 2+ [Breast Appearance] : normal in appearance [Bowel Sounds] : normal bowel sounds [Abdomen Soft] : soft [No CVA Tenderness] : no ~M costovertebral angle tenderness [Abnormal Walk] : normal gait [Involuntary Movements] : no involuntary movements were seen [Skin Color & Pigmentation] : normal skin color and pigmentation [Skin Turgor] : normal skin turgor [No Focal Deficits] : no focal deficits [Oriented To Time, Place, And Person] : oriented to person, place, and time [Impaired Insight] : insight and judgment were intact [Affect] : the affect was normal [Mood] : the mood was normal [Memory Recent] : recent memory was not impaired [Memory Remote] : remote memory was not impaired [FreeTextEntry1] : Deferred